# Patient Record
Sex: FEMALE | Race: WHITE | NOT HISPANIC OR LATINO | URBAN - METROPOLITAN AREA
[De-identification: names, ages, dates, MRNs, and addresses within clinical notes are randomized per-mention and may not be internally consistent; named-entity substitution may affect disease eponyms.]

---

## 2020-07-25 ENCOUNTER — TELEPHONE ENCOUNTER (OUTPATIENT)
Dept: URBAN - METROPOLITAN AREA CLINIC 13 | Facility: CLINIC | Age: 50
End: 2020-07-25

## 2020-07-25 RX ORDER — AZITHROMYCIN DIHYDRATE 250 MG/1
TABLET, FILM COATED ORAL
Qty: 6 | Refills: 0 | OUTPATIENT
Start: 2013-08-31 | End: 2017-08-31

## 2020-07-26 ENCOUNTER — TELEPHONE ENCOUNTER (OUTPATIENT)
Dept: URBAN - METROPOLITAN AREA CLINIC 13 | Facility: CLINIC | Age: 50
End: 2020-07-26

## 2020-07-26 RX ORDER — CIPROFLOXACIN HYDROCHLORIDE 500 MG/1
TABLET, FILM COATED ORAL
Qty: 20 | Refills: 0 | Status: ACTIVE | COMMUNITY
Start: 2018-10-23

## 2020-07-26 RX ORDER — OMEPRAZOLE 40 MG/1
TAKE ONE CAPSULE BY MOUTH ONCE DAILY CAPSULE, DELAYED RELEASE ORAL
Qty: 90 | Refills: 3 | Status: ACTIVE | COMMUNITY
Start: 2017-08-31

## 2020-07-26 RX ORDER — LEVOFLOXACIN 500 MG/1
TABLET, FILM COATED ORAL
Qty: 10 | Refills: 0 | Status: ACTIVE | COMMUNITY
Start: 2013-01-28

## 2020-07-26 RX ORDER — FLUOXETINE HCL 20 MG
TAKE 1 CAPSULE DAILY CAPSULE ORAL
Refills: 0 | Status: ACTIVE | COMMUNITY

## 2020-07-26 RX ORDER — VALACYCLOVIR HYDROCHLORIDE 500 MG/1
TABLET, FILM COATED ORAL
Qty: 90 | Refills: 0 | Status: ACTIVE | COMMUNITY
Start: 2019-06-07

## 2020-07-26 RX ORDER — PREDNISONE 10 MG/1
TABLET ORAL
Qty: 30 | Refills: 0 | Status: ACTIVE | COMMUNITY
Start: 2018-12-18

## 2020-07-26 RX ORDER — NORGESTREL AND ETHINYL ESTRADIOL 0.3-0.03MG
KIT ORAL
Qty: 84 | Refills: 0 | Status: ACTIVE | COMMUNITY
Start: 2012-03-28

## 2020-07-26 RX ORDER — VALACYCLOVIR HYDROCHLORIDE 500 MG/1
TABLET, FILM COATED ORAL
Qty: 30 | Refills: 0 | Status: ACTIVE | COMMUNITY
Start: 2019-04-03

## 2020-07-26 RX ORDER — CEFUROXIME AXETIL 500 MG/1
TABLET, FILM COATED ORAL
Qty: 20 | Refills: 0 | Status: ACTIVE | COMMUNITY
Start: 2013-04-15

## 2020-07-26 RX ORDER — NORGESTREL AND ETHINYL ESTRADIOL 0.3-0.03MG
KIT ORAL
Qty: 84 | Refills: 0 | Status: ACTIVE | COMMUNITY
Start: 2013-07-31

## 2020-07-26 RX ORDER — ETODOLAC 500 MG/1
TABLET, EXTENDED RELEASE ORAL
Qty: 20 | Refills: 0 | Status: ACTIVE | COMMUNITY
Start: 2013-08-15

## 2020-07-26 RX ORDER — NEOMYCIN SULFATE, POLYMYXIN B SULFATE AND DEXAMETHASONE 3.5; 10000; 1 MG/ML; [USP'U]/ML; MG/ML
SUSPENSION/ DROPS OPHTHALMIC
Qty: 5 | Refills: 0 | Status: ACTIVE | COMMUNITY
Start: 2018-11-14

## 2020-07-26 RX ORDER — AMOXICILLIN 500 MG/1
CAPSULE ORAL
Qty: 28 | Refills: 0 | Status: ACTIVE | COMMUNITY
Start: 2018-12-06

## 2020-07-26 RX ORDER — PREDNISONE 10 MG/1
TABLET ORAL
Qty: 30 | Refills: 0 | Status: ACTIVE | COMMUNITY
Start: 2013-01-28

## 2020-07-26 RX ORDER — CLOBETASOL PROPIONATE 0.5 MG/G
OINTMENT TOPICAL
Qty: 60 | Refills: 0 | Status: ACTIVE | COMMUNITY
Start: 2018-10-30

## 2020-07-26 RX ORDER — LEVOFLOXACIN 500 MG/1
TABLET, FILM COATED ORAL
Qty: 7 | Refills: 0 | Status: ACTIVE | COMMUNITY
Start: 2018-12-18

## 2020-07-26 RX ORDER — SULFAMETHOXAZOLE AND TRIMETHOPRIM 800; 160 MG/1; MG/1
TABLET ORAL
Qty: 20 | Refills: 0 | Status: ACTIVE | COMMUNITY
Start: 2018-12-11

## 2020-07-26 RX ORDER — FLUCONAZOLE 150 MG/1
TABLET ORAL
Qty: 2 | Refills: 0 | Status: ACTIVE | COMMUNITY
Start: 2013-01-09

## 2020-07-26 RX ORDER — ALBUTEROL SULFATE 2.5 MG/3ML
SOLUTION RESPIRATORY (INHALATION)
Qty: 255 | Refills: 0 | Status: ACTIVE | COMMUNITY
Start: 2013-01-28

## 2020-07-26 RX ORDER — AZITHROMYCIN DIHYDRATE 250 MG/1
TABLET, FILM COATED ORAL
Qty: 6 | Refills: 0 | Status: ACTIVE | COMMUNITY
Start: 2013-08-08

## 2020-07-26 RX ORDER — ETODOLAC 400 MG/1
TABLET, COATED ORAL
Qty: 12 | Refills: 0 | Status: ACTIVE | COMMUNITY
Start: 2019-01-16

## 2020-07-26 RX ORDER — PREDNISONE 10 MG/1
TABLET ORAL
Qty: 18 | Refills: 0 | Status: ACTIVE | COMMUNITY
Start: 2013-03-28

## 2020-07-26 RX ORDER — FLUCONAZOLE 150 MG/1
TABLET ORAL
Qty: 2 | Refills: 0 | Status: ACTIVE | COMMUNITY
Start: 2018-10-30

## 2020-07-26 RX ORDER — AMOXICILLIN AND CLAVULANATE POTASSIUM 875; 125 MG/1; MG/1
TABLET, FILM COATED ORAL
Qty: 14 | Refills: 0 | Status: ACTIVE | COMMUNITY
Start: 2013-01-09

## 2020-11-05 ENCOUNTER — OFFICE VISIT (OUTPATIENT)
Dept: URBAN - METROPOLITAN AREA CLINIC 113 | Facility: CLINIC | Age: 50
End: 2020-11-05

## 2021-01-07 ENCOUNTER — OFFICE VISIT (OUTPATIENT)
Dept: URBAN - METROPOLITAN AREA CLINIC 113 | Facility: CLINIC | Age: 51
End: 2021-01-07
Payer: COMMERCIAL

## 2021-01-07 VITALS
WEIGHT: 173 LBS | BODY MASS INDEX: 27.8 KG/M2 | SYSTOLIC BLOOD PRESSURE: 117 MMHG | DIASTOLIC BLOOD PRESSURE: 73 MMHG | HEART RATE: 69 BPM | TEMPERATURE: 98.7 F | HEIGHT: 66 IN

## 2021-01-07 DIAGNOSIS — K62.5 BRIGHT RED BLOOD PER RECTUM: ICD-10-CM

## 2021-01-07 DIAGNOSIS — K21.9 GERD: ICD-10-CM

## 2021-01-07 DIAGNOSIS — R10.13 EPIGASTRIC ABDOMINAL PAIN: ICD-10-CM

## 2021-01-07 DIAGNOSIS — K80.20 CHOLELITHIASIS: ICD-10-CM

## 2021-01-07 PROBLEM — 405729008 BRIGHT RED BLOOD PER RECTUM: Status: ACTIVE | Noted: 2021-01-07

## 2021-01-07 PROCEDURE — 99213 OFFICE O/P EST LOW 20 MIN: CPT | Performed by: NURSE PRACTITIONER

## 2021-01-07 RX ORDER — LEVOFLOXACIN 500 MG/1
TABLET, FILM COATED ORAL
Qty: 10 | Refills: 0 | Status: ON HOLD | COMMUNITY
Start: 2013-01-28

## 2021-01-07 RX ORDER — ETODOLAC 400 MG/1
TABLET, COATED ORAL
Qty: 12 | Refills: 0 | Status: ON HOLD | COMMUNITY
Start: 2019-01-16

## 2021-01-07 RX ORDER — HYDROCORTISONE ACETATE 25 MG/1
INSERT 1 SUPPOSITORY AT BEDTIME SUPPOSITORY RECTAL ONCE DAILY
Qty: 14 | Refills: 1 | OUTPATIENT
Start: 2021-01-07 | End: 2021-02-04

## 2021-01-07 RX ORDER — ETODOLAC 500 MG/1
TABLET, EXTENDED RELEASE ORAL
Qty: 20 | Refills: 0 | Status: ON HOLD | COMMUNITY
Start: 2013-08-15

## 2021-01-07 RX ORDER — PREDNISONE 10 MG/1
TABLET ORAL
Qty: 30 | Refills: 0 | Status: ON HOLD | COMMUNITY
Start: 2013-01-28

## 2021-01-07 RX ORDER — VALACYCLOVIR HYDROCHLORIDE 500 MG/1
TABLET, FILM COATED ORAL
Qty: 30 | Refills: 0 | Status: ON HOLD | COMMUNITY
Start: 2019-04-03

## 2021-01-07 RX ORDER — NORGESTREL AND ETHINYL ESTRADIOL 0.3-0.03MG
KIT ORAL
Qty: 84 | Refills: 0 | Status: ON HOLD | COMMUNITY
Start: 2012-03-28

## 2021-01-07 RX ORDER — CIPROFLOXACIN HYDROCHLORIDE 500 MG/1
TABLET, FILM COATED ORAL
Qty: 20 | Refills: 0 | Status: ON HOLD | COMMUNITY
Start: 2018-10-23

## 2021-01-07 RX ORDER — CLOBETASOL PROPIONATE 0.5 MG/G
OINTMENT TOPICAL
Qty: 60 | Refills: 0 | Status: ON HOLD | COMMUNITY
Start: 2018-10-30

## 2021-01-07 RX ORDER — FLUCONAZOLE 150 MG/1
TABLET ORAL
Qty: 2 | Refills: 0 | Status: ON HOLD | COMMUNITY
Start: 2013-01-09

## 2021-01-07 RX ORDER — CEFUROXIME AXETIL 500 MG/1
TABLET, FILM COATED ORAL
Qty: 20 | Refills: 0 | Status: ON HOLD | COMMUNITY
Start: 2013-04-15

## 2021-01-07 RX ORDER — OMEPRAZOLE 40 MG/1
TAKE ONE CAPSULE BY MOUTH ONCE DAILY CAPSULE, DELAYED RELEASE ORAL
Qty: 90 | Refills: 3 | Status: ON HOLD | COMMUNITY
Start: 2017-08-31

## 2021-01-07 RX ORDER — AMOXICILLIN AND CLAVULANATE POTASSIUM 875; 125 MG/1; MG/1
TABLET, FILM COATED ORAL
Qty: 14 | Refills: 0 | Status: ON HOLD | COMMUNITY
Start: 2013-01-09

## 2021-01-07 RX ORDER — AMOXICILLIN 500 MG/1
CAPSULE ORAL
Qty: 28 | Refills: 0 | Status: ON HOLD | COMMUNITY
Start: 2018-12-06

## 2021-01-07 RX ORDER — AZITHROMYCIN DIHYDRATE 250 MG/1
TABLET, FILM COATED ORAL
Qty: 6 | Refills: 0 | Status: ON HOLD | COMMUNITY
Start: 2013-08-08

## 2021-01-07 RX ORDER — NEOMYCIN SULFATE, POLYMYXIN B SULFATE AND DEXAMETHASONE 3.5; 10000; 1 MG/ML; [USP'U]/ML; MG/ML
SUSPENSION/ DROPS OPHTHALMIC
Qty: 5 | Refills: 0 | Status: ON HOLD | COMMUNITY
Start: 2018-11-14

## 2021-01-07 RX ORDER — SULFAMETHOXAZOLE AND TRIMETHOPRIM 800; 160 MG/1; MG/1
TABLET ORAL
Qty: 20 | Refills: 0 | Status: ON HOLD | COMMUNITY
Start: 2018-12-11

## 2021-01-07 RX ORDER — LANSOPRAZOLE 15 MG/1
1 CAPSULE BEFORE A MEAL CAPSULE, DELAYED RELEASE ORAL TWICE DAILY
Qty: 60 CAPSULES | Refills: 2 | OUTPATIENT

## 2021-01-07 RX ORDER — LANSOPRAZOLE 15 MG/1
1 CAPSULE BEFORE A MEAL CAPSULE, DELAYED RELEASE ORAL ONCE A DAY
Status: ACTIVE | COMMUNITY

## 2021-01-07 RX ORDER — FLUOXETINE HCL 20 MG
TAKE 1 CAPSULE DAILY CAPSULE ORAL
Refills: 0 | Status: ON HOLD | COMMUNITY

## 2021-01-07 RX ORDER — ALBUTEROL SULFATE 2.5 MG/3ML
SOLUTION RESPIRATORY (INHALATION)
Qty: 255 | Refills: 0 | Status: ON HOLD | COMMUNITY
Start: 2013-01-28

## 2021-01-07 NOTE — HPI-TODAY'S VISIT:
42-year-old female with a history of depression, GERD, and diarrhea predominant irritable bowel syndrome presenting for  evaluation of abdominal pain. She was last seen 4/12/19 for routine follow-up regarding GERD and IBS D.  Her symptoms were managed with her regimen consisting of omeprazole 40 mg daily and Benefiber.  Regarding elevated liver enzymes secondary to nonalcoholic fatty liver disease, she has recommended efforts at weight reduction with plan for repeat  LFTs every 6 months with her PCP. She returns today with complaint of a one month history of intermittent, aching epigastric pain. The discomfort is often worsened postprandially and is self-limiting, resolving spotaneously within minutes to hours. She denies associated reflux-type symptoms, nausea or vomiting. Her GERD is controlled with lansoprazole 15mg daily. She has tried ibuprofen, without relief of her abdominal pain. She provides a CT of the abdomen and pelvis with contrast from12/14/20 which shows a rounded increased density within the gallbladder suspicious for a noncalcified stone, and a small right adnexal cyst.  Her bowel habits are at baseline, and she has not had diarrhea. She does report occasional small volume red blood per rectum with wiping, which she attributes to internal and external hemorrhoids. Preparation H suppositories provide some relief. She denies associated rectal pain or itching. She has never had a colonoscopy. She takes ibuprofen at least once per week for headaches.

## 2021-01-08 ENCOUNTER — TELEPHONE ENCOUNTER (OUTPATIENT)
Dept: URBAN - METROPOLITAN AREA CLINIC 113 | Facility: CLINIC | Age: 51
End: 2021-01-08

## 2021-01-08 PROBLEM — 266474003 CHOLELITHIASIS: Status: ACTIVE | Noted: 2021-01-07

## 2021-02-17 ENCOUNTER — OFFICE VISIT (OUTPATIENT)
Dept: URBAN - METROPOLITAN AREA CLINIC 107 | Facility: CLINIC | Age: 51
End: 2021-02-17
Payer: COMMERCIAL

## 2021-02-17 VITALS
HEART RATE: 76 BPM | SYSTOLIC BLOOD PRESSURE: 121 MMHG | BODY MASS INDEX: 27.32 KG/M2 | TEMPERATURE: 98.5 F | WEIGHT: 170 LBS | HEIGHT: 66 IN | RESPIRATION RATE: 18 BRPM | DIASTOLIC BLOOD PRESSURE: 78 MMHG

## 2021-02-17 DIAGNOSIS — R10.13 EPIGASTRIC ABDOMINAL PAIN: ICD-10-CM

## 2021-02-17 DIAGNOSIS — K21.9 GERD: ICD-10-CM

## 2021-02-17 PROBLEM — 235595009 GASTROESOPHAGEAL REFLUX DISEASE: Status: ACTIVE | Noted: 2021-01-07

## 2021-02-17 PROCEDURE — 99213 OFFICE O/P EST LOW 20 MIN: CPT | Performed by: NURSE PRACTITIONER

## 2021-02-17 PROCEDURE — G9903 PT SCRN TBCO ID AS NON USER: HCPCS | Performed by: NURSE PRACTITIONER

## 2021-02-17 RX ORDER — LEVOFLOXACIN 500 MG/1
TABLET, FILM COATED ORAL
Qty: 10 | Refills: 0 | Status: ON HOLD | COMMUNITY
Start: 2013-01-28

## 2021-02-17 RX ORDER — CIPROFLOXACIN HYDROCHLORIDE 500 MG/1
TABLET, FILM COATED ORAL
Qty: 20 | Refills: 0 | Status: ON HOLD | COMMUNITY
Start: 2018-10-23

## 2021-02-17 RX ORDER — LANSOPRAZOLE 15 MG/1
1 CAPSULE BEFORE A MEAL CAPSULE, DELAYED RELEASE ORAL TWICE DAILY
Qty: 60 CAPSULES | Refills: 2 | Status: ACTIVE | COMMUNITY

## 2021-02-17 RX ORDER — NEOMYCIN SULFATE, POLYMYXIN B SULFATE AND DEXAMETHASONE 3.5; 10000; 1 MG/ML; [USP'U]/ML; MG/ML
SUSPENSION/ DROPS OPHTHALMIC
Qty: 5 | Refills: 0 | Status: ON HOLD | COMMUNITY
Start: 2018-11-14

## 2021-02-17 RX ORDER — CEFUROXIME AXETIL 500 MG/1
TABLET, FILM COATED ORAL
Qty: 20 | Refills: 0 | Status: ON HOLD | COMMUNITY
Start: 2013-04-15

## 2021-02-17 RX ORDER — OMEPRAZOLE MAGNESIUM 2.5 MG/1
AS DIRECTED GRANULE, DELAYED RELEASE ORAL
Status: ON HOLD | COMMUNITY

## 2021-02-17 RX ORDER — NORGESTREL AND ETHINYL ESTRADIOL 0.3-0.03MG
KIT ORAL
Qty: 84 | Refills: 0 | Status: ON HOLD | COMMUNITY
Start: 2012-03-28

## 2021-02-17 RX ORDER — CLOBETASOL PROPIONATE 0.5 MG/G
OINTMENT TOPICAL
Qty: 60 | Refills: 0 | Status: ON HOLD | COMMUNITY
Start: 2018-10-30

## 2021-02-17 RX ORDER — ETODOLAC 400 MG/1
TABLET, COATED ORAL
Qty: 12 | Refills: 0 | Status: ON HOLD | COMMUNITY
Start: 2019-01-16

## 2021-02-17 RX ORDER — LANSOPRAZOLE 15 MG/1
1 CAPSULE BEFORE A MEAL CAPSULE, DELAYED RELEASE ORAL ONCE A DAY
Status: ON HOLD | COMMUNITY

## 2021-02-17 RX ORDER — FLUCONAZOLE 150 MG/1
TABLET ORAL
Qty: 2 | Refills: 0 | Status: ON HOLD | COMMUNITY
Start: 2013-01-09

## 2021-02-17 RX ORDER — VALACYCLOVIR HYDROCHLORIDE 500 MG/1
TABLET, FILM COATED ORAL
Qty: 30 | Refills: 0 | Status: ON HOLD | COMMUNITY
Start: 2019-04-03

## 2021-02-17 RX ORDER — AMOXICILLIN AND CLAVULANATE POTASSIUM 875; 125 MG/1; MG/1
TABLET, FILM COATED ORAL
Qty: 14 | Refills: 0 | Status: ON HOLD | COMMUNITY
Start: 2013-01-09

## 2021-02-17 RX ORDER — AMOXICILLIN 500 MG/1
CAPSULE ORAL
Qty: 28 | Refills: 0 | Status: ON HOLD | COMMUNITY
Start: 2018-12-06

## 2021-02-17 RX ORDER — SULFAMETHOXAZOLE AND TRIMETHOPRIM 800; 160 MG/1; MG/1
TABLET ORAL
Qty: 20 | Refills: 0 | Status: ON HOLD | COMMUNITY
Start: 2018-12-11

## 2021-02-17 RX ORDER — PREDNISONE 10 MG/1
TABLET ORAL
Qty: 30 | Refills: 0 | Status: ON HOLD | COMMUNITY
Start: 2013-01-28

## 2021-02-17 RX ORDER — ETODOLAC 500 MG/1
TABLET, EXTENDED RELEASE ORAL
Qty: 20 | Refills: 0 | Status: ON HOLD | COMMUNITY
Start: 2013-08-15

## 2021-02-17 RX ORDER — FLUOXETINE HCL 20 MG
TAKE 1 CAPSULE DAILY CAPSULE ORAL
Refills: 0 | Status: ON HOLD | COMMUNITY

## 2021-02-17 RX ORDER — AZITHROMYCIN DIHYDRATE 250 MG/1
TABLET, FILM COATED ORAL
Qty: 6 | Refills: 0 | Status: ON HOLD | COMMUNITY
Start: 2013-08-08

## 2021-02-17 RX ORDER — OMEPRAZOLE 40 MG/1
TAKE ONE CAPSULE BY MOUTH ONCE DAILY CAPSULE, DELAYED RELEASE ORAL
Qty: 90 | Refills: 3 | Status: ON HOLD | COMMUNITY
Start: 2017-08-31

## 2021-02-17 RX ORDER — ALBUTEROL SULFATE 2.5 MG/3ML
SOLUTION RESPIRATORY (INHALATION)
Qty: 255 | Refills: 0 | Status: ON HOLD | COMMUNITY
Start: 2013-01-28

## 2021-02-17 NOTE — HPI-OTHER HISTORIES
She provides a CT of the abdomen and pelvis with contrast from12/14/20 which shows a rounded increased density within the gallbladder suspicious for a noncalcified stone, and a small right adnexal cyst.

## 2021-02-17 NOTE — HPI-TODAY'S VISIT:
42-year-old female with a history of depression, GERD, and diarrhea predominant irritable bowel syndrome presenting for follow-up regarding abdominal pain. She was last seen 1/7/2021 for evaluation of intermittent postprandial epigastric pain, with recent CT findings suspicious for cholelithiasis.  The differential included peptic ulcer disease related to chronic NSAID use.  Her lansoprazole was increased to twice daily, and a RUQ ultrasound and HIDA scan were planned.  Regarding intermittent small-volume red blood per rectum, a diagnostic colonoscopy was recommended.  However, the patient declined endoscopic assessment, requesting conservative management.  She was recommended a daily fiber supplement and topical treatment with Anusol suppositories. Right upper quadrant ultrasound on 1/29/2021 showed hepatic steatosis.  No evidence for cholelithiasis or acute cholecystitis. HIDA scan on 1/29/2021 was normal, with gallbladder ejection fraction 66%. Her rectal bleeding has resolved, and she is having regular bowel movements with Benefiber. She continues to experience intermittent exacerbations of aching, cramping right upper quadrant and epigastric pain, with associated midback/right flank pain. She has not noticed if the pain is related to meals or activity. She denies associated nausea or vomiting. Her GERD is managed with lansoprazole. She has discontinued NSAIDs, utilizing Tylenol when needed.

## 2021-03-15 ENCOUNTER — OFFICE VISIT (OUTPATIENT)
Dept: URBAN - METROPOLITAN AREA CLINIC 113 | Facility: CLINIC | Age: 51
End: 2021-03-15

## 2022-04-06 ENCOUNTER — TELEPHONE ENCOUNTER (OUTPATIENT)
Dept: URBAN - METROPOLITAN AREA CLINIC 92 | Facility: CLINIC | Age: 52
End: 2022-04-06

## 2022-04-11 ENCOUNTER — OFFICE VISIT (OUTPATIENT)
Dept: URBAN - METROPOLITAN AREA CLINIC 113 | Facility: CLINIC | Age: 52
End: 2022-04-11
Payer: COMMERCIAL

## 2022-04-11 VITALS
RESPIRATION RATE: 20 BRPM | HEART RATE: 80 BPM | SYSTOLIC BLOOD PRESSURE: 134 MMHG | BODY MASS INDEX: 27.64 KG/M2 | TEMPERATURE: 99.3 F | HEIGHT: 66 IN | DIASTOLIC BLOOD PRESSURE: 79 MMHG | WEIGHT: 172 LBS

## 2022-04-11 DIAGNOSIS — R93.5 ABNORMAL ABDOMINAL CT SCAN: ICD-10-CM

## 2022-04-11 DIAGNOSIS — R10.13 EPIGASTRIC ABDOMINAL PAIN: ICD-10-CM

## 2022-04-11 PROCEDURE — 99214 OFFICE O/P EST MOD 30 MIN: CPT | Performed by: NURSE PRACTITIONER

## 2022-04-11 RX ORDER — OMEPRAZOLE 40 MG/1
1 CAPSULE 30 MINUTES BEFORE MORNING MEAL CAPSULE, DELAYED RELEASE ORAL ONCE A DAY
Status: ACTIVE | COMMUNITY

## 2022-04-11 RX ORDER — OMEPRAZOLE 40 MG/1
TAKE ONE CAPSULE BY MOUTH ONCE DAILY CAPSULE, DELAYED RELEASE ORAL
Qty: 90 | Refills: 3 | Status: ON HOLD | COMMUNITY
Start: 2017-08-31

## 2022-04-11 RX ORDER — NORGESTREL AND ETHINYL ESTRADIOL 0.3-0.03MG
KIT ORAL
Qty: 84 | Refills: 0 | Status: ON HOLD | COMMUNITY
Start: 2012-03-28

## 2022-04-11 RX ORDER — VALACYCLOVIR HYDROCHLORIDE 500 MG/1
TABLET, FILM COATED ORAL
Qty: 30 | Refills: 0 | Status: ON HOLD | COMMUNITY
Start: 2019-04-03

## 2022-04-11 RX ORDER — FLUCONAZOLE 150 MG/1
TABLET ORAL
Qty: 2 | Refills: 0 | Status: ON HOLD | COMMUNITY
Start: 2013-01-09

## 2022-04-11 RX ORDER — AMOXICILLIN 500 MG/1
CAPSULE ORAL
Qty: 28 | Refills: 0 | Status: ON HOLD | COMMUNITY
Start: 2018-12-06

## 2022-04-11 RX ORDER — PREDNISONE 10 MG/1
TABLET ORAL
Qty: 30 | Refills: 0 | Status: ON HOLD | COMMUNITY
Start: 2013-01-28

## 2022-04-11 RX ORDER — CLOBETASOL PROPIONATE 0.5 MG/G
OINTMENT TOPICAL
Qty: 60 | Refills: 0 | Status: ON HOLD | COMMUNITY
Start: 2018-10-30

## 2022-04-11 RX ORDER — AZITHROMYCIN DIHYDRATE 250 MG/1
TABLET, FILM COATED ORAL
Qty: 6 | Refills: 0 | Status: ON HOLD | COMMUNITY
Start: 2013-08-08

## 2022-04-11 RX ORDER — FLUOXETINE HCL 20 MG
TAKE 1 CAPSULE DAILY CAPSULE ORAL
Refills: 0 | Status: ON HOLD | COMMUNITY

## 2022-04-11 RX ORDER — CIPROFLOXACIN HYDROCHLORIDE 500 MG/1
TABLET, FILM COATED ORAL
Qty: 20 | Refills: 0 | Status: ON HOLD | COMMUNITY
Start: 2018-10-23

## 2022-04-11 RX ORDER — OMEPRAZOLE 40 MG/1
1 CAPSULE 30 MINUTES BEFORE MORNING MEAL CAPSULE, DELAYED RELEASE ORAL ONCE A DAY
OUTPATIENT

## 2022-04-11 RX ORDER — AMOXICILLIN AND CLAVULANATE POTASSIUM 875; 125 MG/1; MG/1
TABLET, FILM COATED ORAL
Qty: 14 | Refills: 0 | Status: ON HOLD | COMMUNITY
Start: 2013-01-09

## 2022-04-11 RX ORDER — OMEPRAZOLE MAGNESIUM 2.5 MG/1
AS DIRECTED GRANULE, DELAYED RELEASE ORAL
Status: ON HOLD | COMMUNITY

## 2022-04-11 RX ORDER — LANSOPRAZOLE 15 MG/1
1 CAPSULE BEFORE A MEAL CAPSULE, DELAYED RELEASE ORAL ONCE A DAY
Status: ON HOLD | COMMUNITY

## 2022-04-11 RX ORDER — ETODOLAC 500 MG/1
TABLET, EXTENDED RELEASE ORAL
Qty: 20 | Refills: 0 | Status: ON HOLD | COMMUNITY
Start: 2013-08-15

## 2022-04-11 RX ORDER — ETODOLAC 400 MG/1
TABLET, COATED ORAL
Qty: 12 | Refills: 0 | Status: ON HOLD | COMMUNITY
Start: 2019-01-16

## 2022-04-11 RX ORDER — LANSOPRAZOLE 15 MG/1
1 CAPSULE BEFORE A MEAL CAPSULE, DELAYED RELEASE ORAL TWICE DAILY
Qty: 60 CAPSULES | Refills: 2 | Status: ON HOLD | COMMUNITY

## 2022-04-11 RX ORDER — SULFAMETHOXAZOLE AND TRIMETHOPRIM 800; 160 MG/1; MG/1
TABLET ORAL
Qty: 20 | Refills: 0 | Status: ON HOLD | COMMUNITY
Start: 2018-12-11

## 2022-04-11 RX ORDER — ALBUTEROL SULFATE 2.5 MG/3ML
SOLUTION RESPIRATORY (INHALATION)
Qty: 255 | Refills: 0 | Status: ON HOLD | COMMUNITY
Start: 2013-01-28

## 2022-04-11 RX ORDER — NEOMYCIN SULFATE, POLYMYXIN B SULFATE AND DEXAMETHASONE 3.5; 10000; 1 MG/ML; [USP'U]/ML; MG/ML
SUSPENSION/ DROPS OPHTHALMIC
Qty: 5 | Refills: 0 | Status: ON HOLD | COMMUNITY
Start: 2018-11-14

## 2022-04-11 RX ORDER — LEVOFLOXACIN 500 MG/1
TABLET, FILM COATED ORAL
Qty: 10 | Refills: 0 | Status: ON HOLD | COMMUNITY
Start: 2013-01-28

## 2022-04-11 RX ORDER — CEFUROXIME AXETIL 500 MG/1
TABLET, FILM COATED ORAL
Qty: 20 | Refills: 0 | Status: ON HOLD | COMMUNITY
Start: 2013-04-15

## 2022-04-11 NOTE — HPI-TODAY'S VISIT:
51-year-old female with a history of depression, GERD, and diarrhea predominant irritable bowel syndrome presenting for evaluation of abdominal pain. She was last seen in the office in February 2021 for follow-up of epigastric and right upper quadrant abdominal pain. Recent RUQ ultrasound and HIDA scan were negative for gallbladder pathology. She requested conservative management, with deferral of endoscopic assessment. She was to continue lansoprazole for GERD and encouraged use of a heating pad for possible referred back pain. An EGD was considered.  Near the end of March, she experienced a recurrence of epigastric pain with associated upper abdominal bloating. Due to persistence of symptoms after a week, she was seen by her PCP with CT as below. She was started on pantoprazole twice daily with little relief. She stopped the medication and began taking a previous prescription of omeprazole 40mg daily, which has been helpful. She has difficulty characterizng the pain, stating is is "uncomfortable." The discomfort is worsened following meals. She denies reflux symptoms, nausea or vomiting. She denies NSAID use. She had some constipation at onset which has subsided.

## 2022-04-11 NOTE — HPI-OTHER HISTORIES
CT abdomen with contrast 3/29/22: wall thickening distal stomach which may reflect gastritis, small hiatal hernia. Right upper quadrant ultrasound on 1/29/2021 showed hepatic steatosis.  No evidence for cholelithiasis or acute cholecystitis. HIDA scan on 1/29/2021 was normal, with gallbladder ejection fraction 66%. She provides a CT of the abdomen and pelvis with contrast from12/14/20 which shows a rounded increased density within the gallbladder suspicious for a noncalcified stone, and a small right adnexal cyst.

## 2022-04-14 PROBLEM — 79922009 EPIGASTRIC PAIN: Status: ACTIVE | Noted: 2021-01-07

## 2022-04-27 ENCOUNTER — TELEPHONE ENCOUNTER (OUTPATIENT)
Dept: URBAN - METROPOLITAN AREA CLINIC 113 | Facility: CLINIC | Age: 52
End: 2022-04-27

## 2022-04-29 ENCOUNTER — OFFICE VISIT (OUTPATIENT)
Dept: URBAN - METROPOLITAN AREA SURGERY CENTER 25 | Facility: SURGERY CENTER | Age: 52
End: 2022-04-29
Payer: COMMERCIAL

## 2022-04-29 DIAGNOSIS — K31.89 REACTIVE GASTROPATHY: ICD-10-CM

## 2022-04-29 DIAGNOSIS — R93.3 ABN FINDINGS-GI TRACT: ICD-10-CM

## 2022-04-29 DIAGNOSIS — K22.2 SCHATZKI'S RING: ICD-10-CM

## 2022-04-29 PROCEDURE — G8907 PT DOC NO EVENTS ON DISCHARG: HCPCS | Performed by: INTERNAL MEDICINE

## 2022-04-29 PROCEDURE — 43239 EGD BIOPSY SINGLE/MULTIPLE: CPT | Performed by: INTERNAL MEDICINE

## 2022-04-29 RX ORDER — OMEPRAZOLE 40 MG/1
TAKE ONE CAPSULE BY MOUTH ONCE DAILY CAPSULE, DELAYED RELEASE ORAL
Qty: 90 | Refills: 3 | Status: ON HOLD | COMMUNITY
Start: 2017-08-31

## 2022-04-29 RX ORDER — LEVOFLOXACIN 500 MG/1
TABLET, FILM COATED ORAL
Qty: 10 | Refills: 0 | Status: ON HOLD | COMMUNITY
Start: 2013-01-28

## 2022-04-29 RX ORDER — OMEPRAZOLE 40 MG/1
1 CAPSULE 30 MINUTES BEFORE MORNING MEAL CAPSULE, DELAYED RELEASE ORAL ONCE A DAY
Status: ACTIVE | COMMUNITY

## 2022-04-29 RX ORDER — LANSOPRAZOLE 15 MG/1
1 CAPSULE BEFORE A MEAL CAPSULE, DELAYED RELEASE ORAL TWICE DAILY
Qty: 60 CAPSULES | Refills: 2 | Status: ON HOLD | COMMUNITY

## 2022-04-29 RX ORDER — AMOXICILLIN AND CLAVULANATE POTASSIUM 875; 125 MG/1; MG/1
TABLET, FILM COATED ORAL
Qty: 14 | Refills: 0 | Status: ON HOLD | COMMUNITY
Start: 2013-01-09

## 2022-04-29 RX ORDER — FLUCONAZOLE 150 MG/1
TABLET ORAL
Qty: 2 | Refills: 0 | Status: ON HOLD | COMMUNITY
Start: 2013-01-09

## 2022-04-29 RX ORDER — NEOMYCIN SULFATE, POLYMYXIN B SULFATE AND DEXAMETHASONE 3.5; 10000; 1 MG/ML; [USP'U]/ML; MG/ML
SUSPENSION/ DROPS OPHTHALMIC
Qty: 5 | Refills: 0 | Status: ON HOLD | COMMUNITY
Start: 2018-11-14

## 2022-04-29 RX ORDER — CEFUROXIME AXETIL 500 MG/1
TABLET, FILM COATED ORAL
Qty: 20 | Refills: 0 | Status: ON HOLD | COMMUNITY
Start: 2013-04-15

## 2022-04-29 RX ORDER — ETODOLAC 400 MG/1
TABLET, COATED ORAL
Qty: 12 | Refills: 0 | Status: ON HOLD | COMMUNITY
Start: 2019-01-16

## 2022-04-29 RX ORDER — PREDNISONE 10 MG/1
TABLET ORAL
Qty: 30 | Refills: 0 | Status: ON HOLD | COMMUNITY
Start: 2013-01-28

## 2022-04-29 RX ORDER — ETODOLAC 500 MG/1
TABLET, EXTENDED RELEASE ORAL
Qty: 20 | Refills: 0 | Status: ON HOLD | COMMUNITY
Start: 2013-08-15

## 2022-04-29 RX ORDER — VALACYCLOVIR HYDROCHLORIDE 500 MG/1
TABLET, FILM COATED ORAL
Qty: 30 | Refills: 0 | Status: ON HOLD | COMMUNITY
Start: 2019-04-03

## 2022-04-29 RX ORDER — CLOBETASOL PROPIONATE 0.5 MG/G
OINTMENT TOPICAL
Qty: 60 | Refills: 0 | Status: ON HOLD | COMMUNITY
Start: 2018-10-30

## 2022-04-29 RX ORDER — NORGESTREL AND ETHINYL ESTRADIOL 0.3-0.03MG
KIT ORAL
Qty: 84 | Refills: 0 | Status: ON HOLD | COMMUNITY
Start: 2012-03-28

## 2022-04-29 RX ORDER — AZITHROMYCIN DIHYDRATE 250 MG/1
TABLET, FILM COATED ORAL
Qty: 6 | Refills: 0 | Status: ON HOLD | COMMUNITY
Start: 2013-08-08

## 2022-04-29 RX ORDER — FLUOXETINE HCL 20 MG
TAKE 1 CAPSULE DAILY CAPSULE ORAL
Refills: 0 | Status: ON HOLD | COMMUNITY

## 2022-04-29 RX ORDER — LANSOPRAZOLE 15 MG/1
1 CAPSULE BEFORE A MEAL CAPSULE, DELAYED RELEASE ORAL ONCE A DAY
Status: ON HOLD | COMMUNITY

## 2022-04-29 RX ORDER — AMOXICILLIN 500 MG/1
CAPSULE ORAL
Qty: 28 | Refills: 0 | Status: ON HOLD | COMMUNITY
Start: 2018-12-06

## 2022-04-29 RX ORDER — CIPROFLOXACIN HYDROCHLORIDE 500 MG/1
TABLET, FILM COATED ORAL
Qty: 20 | Refills: 0 | Status: ON HOLD | COMMUNITY
Start: 2018-10-23

## 2022-04-29 RX ORDER — ALBUTEROL SULFATE 2.5 MG/3ML
SOLUTION RESPIRATORY (INHALATION)
Qty: 255 | Refills: 0 | Status: ON HOLD | COMMUNITY
Start: 2013-01-28

## 2022-04-29 RX ORDER — OMEPRAZOLE MAGNESIUM 2.5 MG/1
AS DIRECTED GRANULE, DELAYED RELEASE ORAL
Status: ON HOLD | COMMUNITY

## 2022-04-29 RX ORDER — SULFAMETHOXAZOLE AND TRIMETHOPRIM 800; 160 MG/1; MG/1
TABLET ORAL
Qty: 20 | Refills: 0 | Status: ON HOLD | COMMUNITY
Start: 2018-12-11

## 2022-05-18 ENCOUNTER — OFFICE VISIT (OUTPATIENT)
Dept: URBAN - METROPOLITAN AREA CLINIC 107 | Facility: CLINIC | Age: 52
End: 2022-05-18
Payer: COMMERCIAL

## 2022-05-18 VITALS
DIASTOLIC BLOOD PRESSURE: 81 MMHG | HEART RATE: 73 BPM | HEIGHT: 66 IN | RESPIRATION RATE: 18 BRPM | WEIGHT: 168 LBS | SYSTOLIC BLOOD PRESSURE: 129 MMHG | BODY MASS INDEX: 27 KG/M2 | TEMPERATURE: 98.4 F

## 2022-05-18 DIAGNOSIS — R10.12 LEFT UPPER QUADRANT ABDOMINAL PAIN: ICD-10-CM

## 2022-05-18 DIAGNOSIS — K29.70 GASTRITIS: ICD-10-CM

## 2022-05-18 DIAGNOSIS — K44.9 HIATAL HERNIA: ICD-10-CM

## 2022-05-18 DIAGNOSIS — Z12.11 SCREENING FOR COLON CANCER: ICD-10-CM

## 2022-05-18 PROBLEM — 4556007 GASTRITIS: Status: ACTIVE | Noted: 2022-05-18

## 2022-05-18 PROCEDURE — 99214 OFFICE O/P EST MOD 30 MIN: CPT | Performed by: NURSE PRACTITIONER

## 2022-05-18 RX ORDER — OMEPRAZOLE 40 MG/1
1 CAPSULE 30 MINUTES BEFORE MORNING MEAL CAPSULE, DELAYED RELEASE ORAL ONCE A DAY
Status: ACTIVE | COMMUNITY

## 2022-05-18 NOTE — HPI-TODAY'S VISIT:
51-year-old female with a history of depression, GERD, and diarrhea predominant irritable bowel syndrome presenting for follow-up after EGD. She was last seen 4/11/22 for evaluation of chronic, intermittent epigastric pain with recent CT revealing thickening of the distal stomach. She was to continue omeprazole and an upper endoscopy was planned. EGD 4/29/22: regular z-line, gastroesophageal flap valve classified as Hill Grade II (fold present, opens with respiration), 3cm hiatal hernia, widely patent Schatzki ring, nonerosive gastritis, normal examined duodenum s/p biopsies which were negative for sprue.  Gastric biopsies showed regenerative changes c/w healing erosion and/or chemical gastropathy; negative for H pylori. She was recommended omeprazole 40mg daily, and encouraged NSAID avoidance. She now complains of left upper quadrant abdominal pain, which progresses throughout the day - worsened with prolonged sitting/car travel or following dinner. This is improved with positional changes, such as sitting up straight or stretching. The pain is not related to bowel movements. She denies associated nausea or vomiting. She has a daily nonbloody stool. She has avoided NSAIDs, using Tylenol alternatively.

## 2022-07-05 ENCOUNTER — OFFICE VISIT (OUTPATIENT)
Dept: URBAN - METROPOLITAN AREA SURGERY CENTER 25 | Facility: SURGERY CENTER | Age: 52
End: 2022-07-05

## 2022-07-25 ENCOUNTER — OFFICE VISIT (OUTPATIENT)
Dept: URBAN - METROPOLITAN AREA CLINIC 107 | Facility: CLINIC | Age: 52
End: 2022-07-25

## 2022-07-26 ENCOUNTER — TELEPHONE ENCOUNTER (OUTPATIENT)
Dept: URBAN - METROPOLITAN AREA CLINIC 113 | Facility: CLINIC | Age: 52
End: 2022-07-26

## 2022-07-26 RX ORDER — OMEPRAZOLE 40 MG/1
1 CAPSULE 30 MINUTES BEFORE MORNING MEAL CAPSULE, DELAYED RELEASE ORAL ONCE A DAY
Qty: 30 | Refills: 5

## 2023-05-22 ENCOUNTER — ERX REFILL RESPONSE (OUTPATIENT)
Dept: URBAN - METROPOLITAN AREA CLINIC 113 | Facility: CLINIC | Age: 53
End: 2023-05-22

## 2023-05-22 RX ORDER — OMEPRAZOLE 40 MG/1
TAKE 1 CAPSULE BY MOUTH ONCE DAILY 30 MINUTES BEFORE BREAKFAST CAPSULE, DELAYED RELEASE ORAL
Qty: 30 CAPSULE | Refills: 0 | OUTPATIENT

## 2023-05-22 RX ORDER — OMEPRAZOLE 40 MG/1
1 CAPSULE 30 MINUTES BEFORE MORNING MEAL CAPSULE, DELAYED RELEASE ORAL ONCE A DAY
Qty: 30 | Refills: 5 | OUTPATIENT

## 2024-04-03 ENCOUNTER — OV EP (OUTPATIENT)
Dept: URBAN - METROPOLITAN AREA CLINIC 113 | Facility: CLINIC | Age: 54
End: 2024-04-03
Payer: COMMERCIAL

## 2024-04-03 VITALS
TEMPERATURE: 97.5 F | RESPIRATION RATE: 18 BRPM | DIASTOLIC BLOOD PRESSURE: 82 MMHG | HEIGHT: 66 IN | SYSTOLIC BLOOD PRESSURE: 129 MMHG | WEIGHT: 172.8 LBS | BODY MASS INDEX: 27.77 KG/M2 | HEART RATE: 75 BPM

## 2024-04-03 DIAGNOSIS — K58.0 IRRITABLE BOWEL SYNDROME WITH DIARRHEA: ICD-10-CM

## 2024-04-03 DIAGNOSIS — Z12.11 COLON CANCER SCREENING: ICD-10-CM

## 2024-04-03 DIAGNOSIS — K21.9 GASTROESOPHAGEAL REFLUX DISEASE, UNSPECIFIED WHETHER ESOPHAGITIS PRESENT: ICD-10-CM

## 2024-04-03 DIAGNOSIS — K59.01 CONSTIPATION: ICD-10-CM

## 2024-04-03 PROBLEM — 197125005: Status: ACTIVE | Noted: 2024-04-03

## 2024-04-03 PROBLEM — 235595009: Status: ACTIVE | Noted: 2024-04-03

## 2024-04-03 PROBLEM — 14760008: Status: ACTIVE | Noted: 2024-04-03

## 2024-04-03 PROCEDURE — 99214 OFFICE O/P EST MOD 30 MIN: CPT

## 2024-04-03 RX ORDER — OMEPRAZOLE 40 MG/1
1 CAPSULE 30 MINUTES BEFORE MORNING MEAL CAPSULE, DELAYED RELEASE ORAL ONCE A DAY
Qty: 90 | Refills: 1 | OUTPATIENT
Start: 2024-04-03

## 2024-04-03 RX ORDER — ESCITALOPRAM OXALATE 10 MG/1
1 TABLET TABLET ORAL ONCE A DAY
Status: ACTIVE | COMMUNITY

## 2024-04-03 RX ORDER — OMEPRAZOLE 40 MG/1
TAKE 1 CAPSULE BY MOUTH ONCE DAILY 30 MINUTES BEFORE BREAKFAST CAPSULE, DELAYED RELEASE ORAL
Qty: 30 CAPSULE | Refills: 0 | Status: ACTIVE | COMMUNITY

## 2024-04-03 RX ORDER — SODIUM, POTASSIUM,MAG SULFATES 17.5-3.13G
354 ML SOLUTION, RECONSTITUTED, ORAL ORAL ONCE
Qty: 354 MILLILITER | Refills: 0 | OUTPATIENT
Start: 2024-04-03 | End: 2024-04-04

## 2024-04-03 NOTE — HPI-TODAY'S VISIT:
53-year-old female with a history of depression, GERD, and diarrhea predominant irritable bowel syndrome presenting for long interval follow-up.  She was last seen on 5/18/2022.  She has been established with Dr. Richards.  Her EGD performed in April 2022 for evaluation of epigastric pain and abnormal CT finding/wall thickening of the stomach revealed a 3 cm hiatal hernia and nonerosive gastritis.  Gastric biopsies were negative for H. pylori and duodenal biopsies were negative for sprue.  She is compliant with PPI and NSAID avoidance.  Her chief complaint was a recent increase in left upper quadrant pain possibly musculoskeletal in origin given the positional nature.  She was recommended Tylenol and use of heating pad.  In addition, she was at average risk for colon cancer and due for routine screening.  Colonoscopy was scheduled for 7/5/2022 and canceled.  She has been lost to follow-up ever since. We do not have updated labs.  She has struggled with severe constipation. She almost passed out during one of her bowel movements. She feels stool is becoming stuck near the rectum. Her bowels have improved over the last two weeks. She has been taking magnesium. She also failed MiraLAX. She is typically diarrhea predominant. The change occurred over the last 6 months. Most females in her family have constipation. Reflux remains well controlled on Omeprazole 40 mg once daily. She does need a new rx as she has been relying on OTC Prilosec.  She will occasionally have blood per rectum. No family history of colon cancer.

## 2024-04-03 NOTE — HPI-OTHER HISTORIES
EGD 4/29/22: regular z-line, gastroesophageal flap valve classified as Hill Grade II (fold present, opens with respiration), 3cm hiatal hernia, widely patent Schatzki ring, nonerosive gastritis, normal examined duodenum s/p biopsies which were negative for sprue. Gastric biopsies showed regenerative changes c/w healing erosion and/or chemical gastropathy; negative for H pylori. She was recommended omeprazole 40mg daily, and encouraged NSAID avoidance.  CT abdomen with contrast 3/29/22: wall thickening distal stomach which may reflect gastritis, small hiatal hernia.  Right upper quadrant ultrasound on 1/29/2021 showed hepatic steatosis.  No evidence for cholelithiasis or acute cholecystitis.  HIDA scan on 1/29/2021 was normal, with gallbladder ejection fraction 66%. She provides a CT of the abdomen and pelvis with contrast from12/14/20 which shows a rounded increased density within the gallbladder suspicious for a noncalcified stone, and a small right adnexal cyst.

## 2024-04-04 LAB
A/G RATIO: 1.6
ABSOLUTE BASOPHILS: 39
ABSOLUTE EOSINOPHILS: 160
ABSOLUTE LYMPHOCYTES: 1688.5
ABSOLUTE MONOCYTES: 484
ABSOLUTE NEUTROPHILS: 3130
ALBUMIN: 4.4
ALKALINE PHOSPHATASE: 58
ALT (SGPT): 18
AST (SGOT): 15
BASOPHILS: 0.7
BILIRUBIN, TOTAL: 0.3
BUN/CREATININE RATIO: (no result)
BUN: 11
C-REACTIVE PROTEIN, QUANT: 2
CALCIUM: 9.3
CARBON DIOXIDE, TOTAL: 28
CHLORIDE: 104
CREATININE: 0.76
EGFR: 95
EOSINOPHILS: 2.9
GLOBULIN, TOTAL: 2.8
GLUCOSE: 85
HEMATOCRIT: 42.2
HEMOGLOBIN: 14.2
LYMPHOCYTES: 30.7
MCH: 31.1
MCHC: 33.6
MCV: 92.3
MONOCYTES: 8.8
MPV: 9.2
NEUTROPHILS: 56.9
PLATELET COUNT: 326
POTASSIUM: 4.1
PROTEIN, TOTAL: 7.2
RDW: 13.1
RED BLOOD CELL COUNT: 4.57
SED RATE BY MODIFIED: 6
SODIUM: 139
TSH: 0.55
WHITE BLOOD CELL COUNT: 5.5

## 2024-05-01 ENCOUNTER — TELEPHONE ENCOUNTER (OUTPATIENT)
Dept: URBAN - METROPOLITAN AREA CLINIC 113 | Facility: CLINIC | Age: 54
End: 2024-05-01

## 2024-05-13 ENCOUNTER — TELEPHONE ENCOUNTER (OUTPATIENT)
Dept: URBAN - METROPOLITAN AREA CLINIC 113 | Facility: CLINIC | Age: 54
End: 2024-05-13

## 2024-05-20 ENCOUNTER — OFFICE VISIT (OUTPATIENT)
Dept: URBAN - METROPOLITAN AREA SURGERY CENTER 25 | Facility: SURGERY CENTER | Age: 54
End: 2024-05-20

## 2024-06-24 ENCOUNTER — OUT OF OFFICE VISIT (OUTPATIENT)
Dept: URBAN - METROPOLITAN AREA SURGERY CENTER 25 | Facility: SURGERY CENTER | Age: 54
End: 2024-06-24
Payer: COMMERCIAL

## 2024-06-24 ENCOUNTER — OFFICE VISIT (OUTPATIENT)
Dept: URBAN - METROPOLITAN AREA SURGERY CENTER 25 | Facility: SURGERY CENTER | Age: 54
End: 2024-06-24

## 2024-06-24 ENCOUNTER — CLAIMS CREATED FROM THE CLAIM WINDOW (OUTPATIENT)
Dept: URBAN - METROPOLITAN AREA CLINIC 4 | Facility: CLINIC | Age: 54
End: 2024-06-24
Payer: COMMERCIAL

## 2024-06-24 DIAGNOSIS — Z12.11 COLON CANCER SCREENING (HIGH RISK): ICD-10-CM

## 2024-06-24 DIAGNOSIS — D12.4 BENIGN NEOPLASM OF DESCENDING COLON: ICD-10-CM

## 2024-06-24 DIAGNOSIS — D12.0 BENIGN NEOPLASM OF CECUM: ICD-10-CM

## 2024-06-24 DIAGNOSIS — D12.2 BENIGN NEOPLASM OF ASCENDING COLON: ICD-10-CM

## 2024-06-24 DIAGNOSIS — K63.5 BENIGN COLON POLYPS: ICD-10-CM

## 2024-06-24 PROCEDURE — 00812 ANES LWR INTST SCR COLSC: CPT | Performed by: ANESTHESIOLOGY

## 2024-06-24 PROCEDURE — 88305 TISSUE EXAM BY PATHOLOGIST: CPT | Performed by: PATHOLOGY

## 2024-06-24 PROCEDURE — 00812 ANES LWR INTST SCR COLSC: CPT | Performed by: ANESTHESIOLOGIST ASSISTANT

## 2024-06-24 RX ORDER — OMEPRAZOLE 40 MG/1
1 CAPSULE 30 MINUTES BEFORE MORNING MEAL CAPSULE, DELAYED RELEASE ORAL ONCE A DAY
Qty: 90 | Refills: 1 | Status: ACTIVE | COMMUNITY
Start: 2024-04-03

## 2024-06-24 RX ORDER — LINACLOTIDE 72 UG/1
1 CAPSULE AT LEAST 30 MINUTES BEFORE THE FIRST MEAL OF THE DAY ON AN EMPTY STOMACH CAPSULE, GELATIN COATED ORAL ONCE A DAY
Qty: 90 | Refills: 3 | Status: ACTIVE | COMMUNITY
Start: 2024-04-29 | End: 2025-04-24

## 2024-06-24 RX ORDER — ESCITALOPRAM OXALATE 10 MG/1
1 TABLET TABLET ORAL ONCE A DAY
Status: ACTIVE | COMMUNITY

## 2024-06-24 RX ORDER — OMEPRAZOLE 40 MG/1
TAKE 1 CAPSULE BY MOUTH ONCE DAILY 30 MINUTES BEFORE BREAKFAST CAPSULE, DELAYED RELEASE ORAL
Qty: 30 CAPSULE | Refills: 0 | Status: ACTIVE | COMMUNITY

## 2024-07-08 ENCOUNTER — OFFICE VISIT (OUTPATIENT)
Dept: URBAN - METROPOLITAN AREA CLINIC 113 | Facility: CLINIC | Age: 54
End: 2024-07-08
Payer: COMMERCIAL

## 2024-07-08 ENCOUNTER — DASHBOARD ENCOUNTERS (OUTPATIENT)
Age: 54
End: 2024-07-08

## 2024-07-08 VITALS
TEMPERATURE: 97.9 F | WEIGHT: 173 LBS | BODY MASS INDEX: 27.8 KG/M2 | HEART RATE: 76 BPM | HEIGHT: 66 IN | SYSTOLIC BLOOD PRESSURE: 121 MMHG | DIASTOLIC BLOOD PRESSURE: 73 MMHG | RESPIRATION RATE: 18 BRPM

## 2024-07-08 DIAGNOSIS — K58.0 IRRITABLE BOWEL SYNDROME WITH DIARRHEA: ICD-10-CM

## 2024-07-08 DIAGNOSIS — K59.09 CHANGE IN BOWEL MOVEMENTS INTERMITTENT CONSTIPATION. URGENCY IN THE MORNING.: ICD-10-CM

## 2024-07-08 DIAGNOSIS — D12.6 TUBULAR ADENOMA OF COLON: ICD-10-CM

## 2024-07-08 DIAGNOSIS — K21.9 ACID REFLUX: ICD-10-CM

## 2024-07-08 PROCEDURE — 99214 OFFICE O/P EST MOD 30 MIN: CPT

## 2024-07-08 RX ORDER — OMEPRAZOLE 40 MG/1
TAKE 1 CAPSULE BY MOUTH ONCE DAILY 30 MINUTES BEFORE BREAKFAST CAPSULE, DELAYED RELEASE ORAL
Qty: 30 CAPSULE | Refills: 0 | Status: ACTIVE | COMMUNITY

## 2024-07-08 RX ORDER — LINACLOTIDE 72 UG/1
1 CAPSULE AT LEAST 30 MINUTES BEFORE THE FIRST MEAL OF THE DAY ON AN EMPTY STOMACH CAPSULE, GELATIN COATED ORAL ONCE A DAY
Qty: 90 | Refills: 3 | Status: ACTIVE | COMMUNITY
Start: 2024-04-29 | End: 2025-04-24

## 2024-07-08 RX ORDER — ESCITALOPRAM OXALATE 10 MG/1
1 TABLET TABLET ORAL ONCE A DAY
Status: ACTIVE | COMMUNITY

## 2024-07-08 RX ORDER — OMEPRAZOLE 40 MG/1
1 CAPSULE 30 MINUTES BEFORE MORNING MEAL CAPSULE, DELAYED RELEASE ORAL ONCE A DAY
Qty: 90 | Refills: 1 | OUTPATIENT

## 2024-07-08 NOTE — HPI-TODAY'S VISIT:
53 year old female presents for follow up. She was last seen in APril 2024. She was planned for labs and colonoscopy. SHe was given samples of Linzess 72 mcg for constipation if recurred.   Colonoscopy 6/24/2024: BBPS score of 9. TI was normal. Removal of four 7-9 mm non bleeding polyps in the cecum, ascending colon and descending colon. Polyps revealed mixed tubular adenomas and sessile serrated adenomas. Repeat in 3 years.  Labs 4/3/2024: normal ESR, CRP, CBC, CMP and TSH.   She took Linzess once and it caused severe diarrhea and vomiting. She has been better since then. Her bowels are now regular. She is not quite back to her diarrhea predominant bowel habits. She has failed magnesium products in the past for constipation and feels fiber products have caused constipation in the past.   4/2024: 53-year-old female with a history of depression, GERD, and diarrhea predominant irritable bowel syndrome presenting for long interval follow-up.  She was last seen on 5/18/2022.  She has been established with Dr. Richards.  Her EGD performed in April 2022 for evaluation of epigastric pain and abnormal CT finding/wall thickening of the stomach revealed a 3 cm hiatal hernia and nonerosive gastritis.  Gastric biopsies were negative for H. pylori and duodenal biopsies were negative for sprue.  She is compliant with PPI and NSAID avoidance.  Her chief complaint was a recent increase in left upper quadrant pain possibly musculoskeletal in origin given the positional nature.  She was recommended Tylenol and use of heating pad.  In addition, she was at average risk for colon cancer and due for routine screening.  Colonoscopy was scheduled for 7/5/2022 and canceled.  She has been lost to follow-up ever since. We do not have updated labs.  She has struggled with severe constipation. She almost passed out during one of her bowel movements. She feels stool is becoming stuck near the rectum. Her bowels have improved over the last two weeks. She has been taking magnesium. She also failed MiraLAX. She is typically diarrhea predominant. The change occurred over the last 6 months. Most females in her family have constipation. Reflux remains well controlled on Omeprazole 40 mg once daily. She does need a new rx as she has been relying on OTC Prilosec.  She will occasionally have blood per rectum. No family history of colon cancer.

## 2025-01-02 ENCOUNTER — ERX REFILL RESPONSE (OUTPATIENT)
Dept: URBAN - METROPOLITAN AREA CLINIC 107 | Facility: CLINIC | Age: 55
End: 2025-01-02

## 2025-01-02 RX ORDER — OMEPRAZOLE 40 MG/1
1 CAPSULE 30 MINUTES BEFORE MORNING MEAL CAPSULE, DELAYED RELEASE ORAL ONCE A DAY
Qty: 90 | Refills: 1 | OUTPATIENT

## 2025-06-26 ENCOUNTER — ERX REFILL RESPONSE (OUTPATIENT)
Dept: URBAN - METROPOLITAN AREA CLINIC 113 | Facility: CLINIC | Age: 55
End: 2025-06-26

## 2025-06-26 RX ORDER — LINACLOTIDE 72 UG/1
1 CAPSULE AT LEAST 30 MINUTES BEFORE THE FIRST MEAL OF THE DAY ON AN EMPTY STOMACH CAPSULE, GELATIN COATED ORAL ONCE A DAY
Qty: 90 | Refills: 0 | OUTPATIENT

## 2025-07-01 ENCOUNTER — OFFICE VISIT (OUTPATIENT)
Dept: URBAN - METROPOLITAN AREA CLINIC 113 | Facility: CLINIC | Age: 55
End: 2025-07-01
Payer: COMMERCIAL

## 2025-07-01 DIAGNOSIS — K21.9 GASTROESOPHAGEAL REFLUX DISEASE, UNSPECIFIED WHETHER ESOPHAGITIS PRESENT: ICD-10-CM

## 2025-07-01 DIAGNOSIS — K59.01 SLOW TRANSIT CONSTIPATION: ICD-10-CM

## 2025-07-01 DIAGNOSIS — K64.1 GRADE II HEMORRHOIDS: ICD-10-CM

## 2025-07-01 PROBLEM — 35298007: Status: ACTIVE | Noted: 2025-07-01

## 2025-07-01 PROBLEM — 721704005: Status: ACTIVE | Noted: 2025-07-01

## 2025-07-01 PROCEDURE — 99214 OFFICE O/P EST MOD 30 MIN: CPT | Performed by: INTERNAL MEDICINE

## 2025-07-01 RX ORDER — OMEPRAZOLE 40 MG/1
1 CAPSULE 30 MINUTES BEFORE MORNING MEAL CAPSULE, DELAYED RELEASE ORAL ONCE A DAY
Qty: 90 | Refills: 1 | Status: ACTIVE | COMMUNITY

## 2025-07-01 RX ORDER — LINACLOTIDE 72 UG/1
1 CAPSULE AT LEAST 30 MINUTES BEFORE THE FIRST MEAL OF THE DAY ON AN EMPTY STOMACH CAPSULE, GELATIN COATED ORAL ONCE A DAY
Qty: 90 | Refills: 0 | Status: ACTIVE | COMMUNITY

## 2025-07-01 RX ORDER — ESCITALOPRAM OXALATE 10 MG/1
1 TABLET TABLET ORAL ONCE A DAY
Status: ON HOLD | COMMUNITY

## 2025-07-01 NOTE — HPI-ZZZTODAY'S VISIT
54-year-old female presenting for follow-up.  She was last seen in July 2024 for after colonoscopy.  She was found to have 4 adenomas and surveillance was recommended in 3 years.  She also has a history of constipation predominant bowel habits.  This was managed with Linzess.  She could not take Benefiber secondary to constipation.  She also has GERD.  She was placed on omeprazole daily and that did control her symptoms.  She is here today for follow-up.  She has always had IBS until the last 2 yrs she has constipation. Last week she had severe constipation and had emesis while going to the bathroom. She was taking Linzess every morning and it did seem to work for a few days until last week she did not have bowel movements. She started taking magnesium OTC in the afternoons and that was working but stopped.   She also feels like she has increased GERD/pain. She has discomfort in the upper abdomen. She had an MRI last year. which showed gastritis. She has had an EGD in 2023.

## 2025-07-08 ENCOUNTER — OFFICE VISIT (OUTPATIENT)
Dept: URBAN - METROPOLITAN AREA CLINIC 113 | Facility: CLINIC | Age: 55
End: 2025-07-08

## 2025-07-09 ENCOUNTER — TELEPHONE ENCOUNTER (OUTPATIENT)
Dept: URBAN - METROPOLITAN AREA CLINIC 113 | Facility: CLINIC | Age: 55
End: 2025-07-09

## 2025-08-05 ENCOUNTER — TELEPHONE ENCOUNTER (OUTPATIENT)
Dept: URBAN - METROPOLITAN AREA CLINIC 113 | Facility: CLINIC | Age: 55
End: 2025-08-05

## 2025-08-05 RX ORDER — TENAPANOR HYDROCHLORIDE 53.2 MG/1
1 TABLET IMMEDIATELY BEFORE MEALS TABLET ORAL TWICE A DAY
Qty: 180 TABLET | Refills: 3 | OUTPATIENT
Start: 2025-08-05

## 2025-08-19 ENCOUNTER — TELEPHONE ENCOUNTER (OUTPATIENT)
Dept: URBAN - METROPOLITAN AREA CLINIC 113 | Facility: CLINIC | Age: 55
End: 2025-08-19

## 2025-08-19 RX ORDER — TENAPANOR HYDROCHLORIDE 53.2 MG/1
1 TABLET IMMEDIATELY BEFORE MEALS TABLET ORAL TWICE A DAY
Qty: 180 | Refills: 0
Start: 2025-08-05

## 2025-08-25 ENCOUNTER — TELEPHONE ENCOUNTER (OUTPATIENT)
Dept: URBAN - METROPOLITAN AREA CLINIC 113 | Facility: CLINIC | Age: 55
End: 2025-08-25